# Patient Record
Sex: MALE | URBAN - METROPOLITAN AREA
[De-identification: names, ages, dates, MRNs, and addresses within clinical notes are randomized per-mention and may not be internally consistent; named-entity substitution may affect disease eponyms.]

---

## 2019-09-06 ENCOUNTER — TRANSFERRED RECORDS (OUTPATIENT)
Dept: HEALTH INFORMATION MANAGEMENT | Facility: CLINIC | Age: 69
End: 2019-09-06

## 2019-12-20 ENCOUNTER — TRANSFERRED RECORDS (OUTPATIENT)
Dept: HEALTH INFORMATION MANAGEMENT | Facility: CLINIC | Age: 69
End: 2019-12-20

## 2019-12-21 ENCOUNTER — MEDICAL CORRESPONDENCE (OUTPATIENT)
Dept: HEALTH INFORMATION MANAGEMENT | Facility: CLINIC | Age: 69
End: 2019-12-21

## 2020-01-08 ENCOUNTER — TELEPHONE (OUTPATIENT)
Dept: OPHTHALMOLOGY | Facility: CLINIC | Age: 70
End: 2020-01-08

## 2020-01-08 DIAGNOSIS — H35.711 CENTRAL SEROUS CHORIORETINOPATHY OF RIGHT EYE: Primary | ICD-10-CM

## 2020-01-08 NOTE — TELEPHONE ENCOUNTER
Reviewed with pt facilitator likely able to f/u scheduling tomorrow  Message to our team to review coverage for PDT therapy sent to have idea if would be covered, but not certain of coverage until day of appt  Reviewed if recommended and approved and pt would like to go thru with treatment-- would be able to have done same day    Reviewed light sensitivities-- stay out of sun for 3 days and able to review in full if procedure performed    Note to facilitator for assistance in scheduling  Cameron Paez RN 5:49 PM 01/08/20        [facility]    Phone Message    May a detailed message be left on voicemail: yes    Reason for Call: Other: Pt states that '[facility]' has been trying to get a hold of pt. Pt says it sounds like we needed additional information but pt is unsure of what.     Pt would like a call back from Corinne/team to discuss Pt's condition. Pt says that he's been seen by multiple different providers and Pt diagnosis is still unclear.     Pt ask that we review his chart/record and call pt back to discuss logistics, plan of action, risks.     Pt [place] 2[place]5 hrs away and it would be pt and his wife driving down and they both dont drive well especially in the cities. When pt comes down, pt would like to get everything taken care of at once if possible or as much that can be done as possible and they will most likely have to stay at a nearby hotel.    Pt says that he's been calling [facility]'s direct line and but hasn't been able to reach anyone.     Please call pt back.     Action Taken: Message routed to:  [facility] ([facility] eye

## 2020-02-10 ENCOUNTER — TELEPHONE (OUTPATIENT)
Dept: OPHTHALMOLOGY | Facility: CLINIC | Age: 70
End: 2020-02-10

## 2020-02-10 NOTE — TELEPHONE ENCOUNTER
[facility]    Phone Message    May a detailed message be left on voicemail: yes     Reason for Call: Other: Pt would like to discuss with Clinic about coarse of treatment for him     Action Taken: Message routed to:  [facility] ([facility] eye    Travel Screening: Not Applicable     Seems to be getting better, va almost 20/20, afraid of pdt causing worse va and medicare does not cover . Will observe and continue to follow up with local MD and will call if there is a need,  RAS Pedro COT 11:08 AM February 12, 2020